# Patient Record
Sex: FEMALE | Race: WHITE | NOT HISPANIC OR LATINO | URBAN - METROPOLITAN AREA
[De-identification: names, ages, dates, MRNs, and addresses within clinical notes are randomized per-mention and may not be internally consistent; named-entity substitution may affect disease eponyms.]

---

## 2023-01-17 ENCOUNTER — OUTPATIENT (OUTPATIENT)
Dept: OUTPATIENT SERVICES | Facility: HOSPITAL | Age: 28
LOS: 1 days | Discharge: HOME | End: 2023-01-17

## 2023-01-17 VITALS — DIASTOLIC BLOOD PRESSURE: 79 MMHG | SYSTOLIC BLOOD PRESSURE: 103 MMHG | HEART RATE: 93 BPM

## 2023-01-17 VITALS
RESPIRATION RATE: 18 BRPM | TEMPERATURE: 98 F | SYSTOLIC BLOOD PRESSURE: 103 MMHG | DIASTOLIC BLOOD PRESSURE: 79 MMHG | HEART RATE: 93 BPM

## 2023-01-17 NOTE — OB PROVIDER TRIAGE NOTE - HISTORY OF PRESENT ILLNESS
[place] #[id number]     27y , @38w2d, JOHANNA 23 by early sono, presenting for contractions. Reports contractions since yesterday AM, increasing in frequency, occurring every 5min, 5/10 in pain. Also reports a leakage of fluid this morning. Seen in Health system yesterday for same complaint, found to 1 cm.  Denies vaginal bleeding. Good fetal movement.  Denies complications this pregnancy.

## 2023-01-17 NOTE — OB PROVIDER TRIAGE NOTE - NS_OBGYNHISTORY_OBGYN_ALL_OB_FT
labor @36 weeks, , unknown weight   FT NSVDx1, no complications, unknown weight    GynHx: denies h/o fibroids, cysts, abnormal paps, stis.

## 2023-01-17 NOTE — OB PROVIDER TRIAGE NOTE - NSOBPROVIDERNOTE_OBGYN_ALL_OB_FT
27y  @38w2d, GBS unknown, not in labor, reassuring maternal and fetal status     - Discharge patient home with labor precautions, patient vocalized understanding, all questions answered   - Has appointment with Arnel tomorrow, states will go   - Discussed at home pain management including conservative measures - hot showers, Tylenol, hot compresses etc.     Dr. Olvera and Dr. Seals aware.

## 2023-01-17 NOTE — OB PROVIDER TRIAGE NOTE - NSHPPHYSICALEXAM_GEN_ALL_CORE
Vital Signs Last 24 Hrs  T(C): 36.7 (17 Jan 2023 14:38), Max: 36.7 (17 Jan 2023 14:38)  T(F): 98 (17 Jan 2023 14:38), Max: 98 (17 Jan 2023 14:38)  HR: 93 (17 Jan 2023 14:38) (93 - 93)  BP: 103/79 (17 Jan 2023 14:38) (103/79 - 103/79)  RR: 18 (17 Jan 2023 14:38) (18 - 18)    Gen: AOx3, no acute distress  Abd: soft, gravid, non tender, mildly palpable contractions  Ext: No edema bilaterally    EFM: 130 /mod/+accels; cat 1  [place]: q2-4  SVE: 1/0/-3 vertex intact @1515  Speculum: normal vagina and cervix, no pooling, nitrazine negative

## 2023-01-17 NOTE — OB RN TRIAGE NOTE - FALL HARM RISK - UNIVERSAL INTERVENTIONS
Bed in lowest position, wheels locked, appropriate side rails in place/Call bell, personal items and telephone in reach/Instruct patient to call for assistance before getting out of bed or chair/Non-slip footwear when patient is out of bed/[place] to call system/Physically safe environment - no spills, clutter or unnecessary equipment/Purposeful Proactive Rounding/Room/bathroom lighting operational, light cord in reach

## 2023-01-17 NOTE — OB PROVIDER TRIAGE NOTE - ADDITIONAL INSTRUCTIONS
Return to L&D if your contractions increase in intensity and frequency if you experience leakage of fluid or vagainl bleeding or if you feel decreased fetal movement.

## 2023-01-18 DIAGNOSIS — O26.893 OTHER SPECIFIED PREGNANCY RELATED CONDITIONS, THIRD TRIMESTER: ICD-10-CM

## 2023-01-18 DIAGNOSIS — O47.02 FALSE LABOR BEFORE 37 COMPLETED WEEKS OF GESTATION, SECOND TRIMESTER: ICD-10-CM
